# Patient Record
Sex: MALE | Race: WHITE | Employment: FULL TIME | URBAN - METROPOLITAN AREA
[De-identification: names, ages, dates, MRNs, and addresses within clinical notes are randomized per-mention and may not be internally consistent; named-entity substitution may affect disease eponyms.]

---

## 2019-08-01 ENCOUNTER — HOSPITAL ENCOUNTER (EMERGENCY)
Facility: CLINIC | Age: 29
Discharge: HOME | End: 2019-08-01
Attending: STUDENT IN AN ORGANIZED HEALTH CARE EDUCATION/TRAINING PROGRAM | Admitting: STUDENT IN AN ORGANIZED HEALTH CARE EDUCATION/TRAINING PROGRAM

## 2019-08-01 VITALS
HEIGHT: 71 IN | DIASTOLIC BLOOD PRESSURE: 63 MMHG | WEIGHT: 220 LBS | HEART RATE: 99 BPM | OXYGEN SATURATION: 95 % | RESPIRATION RATE: 18 BRPM | SYSTOLIC BLOOD PRESSURE: 132 MMHG | BODY MASS INDEX: 30.8 KG/M2 | TEMPERATURE: 98.6 F

## 2019-08-01 DIAGNOSIS — S81.811A LACERATION OF RIGHT LOWER EXTREMITY, INITIAL ENCOUNTER: ICD-10-CM

## 2019-08-01 PROCEDURE — 12002 RPR S/N/AX/GEN/TRNK2.6-7.5CM: CPT

## 2019-08-01 PROCEDURE — 12002 RPR S/N/AX/GEN/TRNK2.6-7.5CM: CPT | Mod: Z6 | Performed by: STUDENT IN AN ORGANIZED HEALTH CARE EDUCATION/TRAINING PROGRAM

## 2019-08-01 PROCEDURE — 99283 EMERGENCY DEPT VISIT LOW MDM: CPT | Mod: 25 | Performed by: STUDENT IN AN ORGANIZED HEALTH CARE EDUCATION/TRAINING PROGRAM

## 2019-08-01 PROCEDURE — 99284 EMERGENCY DEPT VISIT MOD MDM: CPT | Mod: 25

## 2019-08-01 ASSESSMENT — MIFFLIN-ST. JEOR: SCORE: 1985.04

## 2019-08-01 NOTE — ED PROVIDER NOTES
"  History     Chief Complaint   Patient presents with     Laceration     R leg/hip     HPI  Phillip Sheriff is a 29 year old male who presents for evaluation of right lower externally laceration sustained 1 hour prior to arrival.  Patient explains that he had used a new knife to attempt to cut a zip tie he was using as a belt when the knife slipped resulting in a cut through his jeans along the proximal right leg.  Lacerations on the lateral aspect of his right leg, bleeding controlled prior to arrival with pressure and bandages.  He believes his tetanus is up-to-date, checked and was last received in 2015.  He denies weakness or sensory deficits.  Ambulatory without bony pain.    Allergies:  No Known Allergies    Problem List:    There are no active problems to display for this patient.       Past Medical History:    No past medical history on file.    Past Surgical History:    No past surgical history on file.    Family History:    No family history on file.    Social History:  Marital Status:   [2]  Social History     Tobacco Use     Smoking status: Not on file   Substance Use Topics     Alcohol use: Not on file     Drug use: Not on file        Medications:      No current outpatient medications on file.      Review of Systems  Constitutional:  Negative for fever or illness.  Neurological:  Negative for weakness or sensory deficits.  Skin: Positive for laceration of right leg.    All others reviewed and are negative.      Physical Exam   BP: (!) 151/82  Pulse: 99  Temp: 98.6  F (37  C)  Resp: 18  Height: 180.3 cm (5' 11\")  Weight: 99.8 kg (220 lb)  SpO2: 96 %      Physical Exam  Constitutional:  Well developed, well nourished.  Appears nontoxic and in no acute distress.   HENT:  Normocephalic and atraumatic.  Eyes:  Conjunctivae are normal.  Neck:  Neck supple.  Cardiovascular:  No cyanosis.   Respiratory:  Effort normal, no respiratory distress.   Musculoskeletal:  Moves extremities spontaneously.  No " right hip or femur tenderness.  Neurological:  Patient is alert.  Skin:  Skin is warm and dry.  5 cm laceration across proximal lateral right leg.  Psychiatric:  Normal mood and affect.      ED Course        Procedures          [facility] Procedure Note       Procedure:  Laceration Repair   Performed by:  Tomer Knight    Consent:  Patient who states an understanding of the procedure being performed after discussing the risks, benefits, and alternatives of the agreed method of wound repair.  They understand that although the wound has been cleaned/irrigated thoroughly, we cannot with absolute certainty prevent infection and they must monitor the healing process closely for signs of infection.  Also, all skin wounds will form a scar but the repair will improve the cosmetic outcomes.    Body area:  Right leg  Laceration length:  5 cm  Contamination:  The wound is not contaminated.  Foreign bodies after meticulous investigation:  none  Tendon involvement:  none  Nerve involvement:  Sensation completely intact.  Anesthesia type:  Local  Local anesthetic agent:  Bupivacaine 0.5%  Anesthetic total:  8 ml  Irrigation:  Copious irrigation via normal saline.  Preparation:  Patient was cleaned and draped in usual sterile fashion for repair.    Debridement:  none  Skin closure:  Closed with 9 x 3.0 Ethilon  Technique:  interrupted  Approximation:  Close  Approximation difficulty:  simple    Patient tolerance: Patient tolerated the procedure well with no immediate complications.    Nursing staff was assigned to apply antibiotic ointment and dress the wound after the repair.     They have been instructed to monitor wound healing closely for any signs of infection.          Critical Care time:  none               No results found for this or any previous visit (from the past 24 hour(s)).    Medications - No data to display    Assessments & Plan (with Medical Decision Making)   Phillip Sheriff is a 29 year  old male who presents to the department for evaluation of   Laceration of right leg. Based on his symptoms and the clinical examination, there is no evidence to suggest bony injury, joint involvement, tendon laceration, or neurovascular deficits. His wound was cleaned, irrigated, thoroughly inspected and no foreign body or heavy contamination found.  The laceration repaired, wound edges well approximated, and the patient tolerated well without complications.  After repair antibiotic ointment was applied and the wound was dressed.  They were instructed to remove the bandage within 24 hours and apply OTC antibiotic ointment twice daily while healing.  Recommended follow-up in 10 days at a primary care clinic for reevaluation of the wound and removal of sutures.     During the repair we discussed the likelihood that there will be some residual scarring.  Although the wound was thoroughly cleaned/irrigated, he has been instructed to monitor the wound closely for healing or signs of infection such as increasing pain, redness, discharge, or fever.  If any are present they should return to the emergency department.      Disclaimer: This note consists of symbols derived from keyboarding, dictation, and/or voice recognition software. As a result, there may be errors in the script that have gone undetected.  Please consider this when interpreting information found in the chart.       I have reviewed the nursing notes.    I have reviewed the findings, diagnosis, plan and need for follow up with the patient.          Medication List      There are no discharge medications for this visit.         Final diagnoses:   Laceration of right lower extremity, initial encounter       8/1/2019   [facility] EMERGENCY DEPARTMENT     Tomer Knight DO  08/01/19 0613

## 2019-08-01 NOTE — ED AVS SNAPSHOT
[facility]  [address] [address]  Phone:  [phone]  Fax:  [phone]                                    Phillip Sheriff   MRN: [id number]    Department:  [facility]   Date of Visit:  8/1/2019           After Visit Summary Signature Page    I have received my discharge instructions, and my questions have been answered. I have discussed any challenges I see with this plan with the nurse or doctor.    ..........................................................................................................................................  Patient/Patient Representative Signature      ..........................................................................................................................................  Patient Representative Print Name and Relationship to Patient    ..................................................               ................................................  Date                                   Time    ..........................................................................................................................................  Reviewed by Signature/Title    ...................................................              ..............................................  Date                                               Time          22EPIC Rev 08/18

## 2020-12-04 ENCOUNTER — VIRTUAL VISIT (OUTPATIENT)
Dept: FAMILY MEDICINE | Facility: OTHER | Age: 30
End: 2020-12-04

## 2020-12-04 NOTE — PROGRESS NOTES
"Date: 2020 16:31:00  Clinician: Jacque Costello  Clinician NPI: [id number]  Patient: Phillip Sheriff  Patient : 1990  Patient Address: [address] [place] [address]  Patient Phone: [phone]  Visit Protocol: URI  Patient Summary:  Phillip is a 30 year old ( : 1990 ) male who initiated a [facility] Visit for COVID-19 (Coronavirus) evaluation and screening. When asked the question \"Please sign me up to receive news, health information and promotions. \", Phillip responded \"No\".    Phillip states his symptoms started 1-2 days ago.   His symptoms consist of a headache and facial pain or pressure.   Symptom details     Facial pain or pressure: The facial pain or pressure feels worse when bending over or leaning forward.     Headache: He states the headache is mild (1-3 on a 10 point pain scale).      Phillip denies having ear pain, wheezing, fever, cough, nasal congestion, nausea, vomiting, rhinitis, myalgias, chills, malaise, sore throat, teeth pain, ageusia, diarrhea, and anosmia. He also denies taking antibiotic medication in the past month and having recent facial or sinus surgery in the past 60 days. He is not experiencing dyspnea.    Pertinent COVID-19 (Coronavirus) information  Phillip does not work or volunteer as healthcare worker or a . In the past 14 days, Phillip has not worked or volunteered at a healthcare facility or group living setting.   In the past 14 days, he also has not lived in a congregate living setting.   Phillip has had a close contact with a laboratory-confirmed COVID-19 patient within 14 days of symptom onset. He was not exposed at his work. Date Phillip was exposed to the laboratory-confirmed COVID-19 patient: 2020   Additional information about contact with COVID-19 (Coronavirus) patient as reported by the patient (free text): In law    Since 2019, Phillip has not been tested for COVID-19 and has not had upper respiratory infection or " influenza-like illness.   Pertinent medical history  He has not been told by his provider to avoid NSAIDs.   Phillip does not have diabetes. He denies having immunosuppressive conditions (e.g., chemotherapy, HIV, organ transplant, long-term use of steroids or other immunosuppressive medications, splenectomy). He does not have severe COPD and congestive heart failure. He does not have asthma.   Phillip needs a return to work/school note.   Weight: 230 lbs   Phillip does not smoke or use smokeless tobacco.   Weight: 230 lbs    MEDICATIONS: No current medications, ALLERGIES: NKDA  Clinician Response:  Dear Phillip,   Your symptoms show that you may have coronavirus (COVID-19). This illness can cause fever, cough and trouble breathing. Many people get a mild case and get better on their own. Some people can get very sick.  What should I do?  We would like to test you for this virus.   1. Please call [phone] to schedule your visit. Explain that you were referred by [facility] to have a COVID-19 test. Be ready to share your [facility] visit ID number.  * If you need to schedule in [facility] please call [phone] or for [facility] employees please call [phone].  * If you need to schedule in the [place] area please call [phone]. [employer] [facility] employees call [phone].  The following will serve as your written order for this COVID Test, ordered by me, for the indication of suspected COVID [Z20.828]: The test will be ordered in [employer], our electronic health record, after you are scheduled. It will show as ordered and authorized by Todd Chaney MD.  Order: COVID-19 (Coronavirus) PCR for SYMPTOMATIC testing from [facility].   2. When it's time for your COVID test:  Stay at least 6 feet away from others. (If someone will drive you to your test, stay in the backseat, as far away from the  as you can.)   Cover your mouth and nose with a mask, tissue or washcloth.  Go straight to the testing site. Don't make any stops on the way  "there or back.      3.Starting now: Stay home and away from others (self-isolate) until:   You've had no fever---and no medicine that reduces fever---for one full day (24 hours). And...   Your other symptoms have gotten better. For example, your cough or breathing has improved. And...   At least 10 days have passed since your symptoms started.       During this time, don't leave the house except for testing or medical care.   Stay in your own room, even for meals. Use your own bathroom if you can.   Stay away from others in your home. No hugging, kissing or shaking hands. No visitors.  Don't go to work, school or anywhere else.    Clean \"high touch\" surfaces often (doorknobs, counters, handles, etc.). Use a household cleaning spray or wipes. You'll find a full list of  on the EPA website: www.epa.gov/pesticide-registration/list-n-disinfectants-use-against-sars-cov-2.   Cover your mouth and nose with a mask, tissue or washcloth to avoid spreading germs.  Wash your hands and face often. Use soap and water.  Caregivers in these groups are at risk for severe illness due to COVID-19:  o People 65 years and older  o People who live in a nursing home or long-term care facility  o People with chronic disease (lung, heart, cancer, diabetes, kidney, liver, immunologic)  o People who have a weakened immune system, including those who:   Are in cancer treatment  Take medicine that weakens the immune system, such as corticosteroids  Had a bone marrow or organ transplant  Have an immune deficiency  Have poorly controlled HIV or AIDS  Are obese (body mass index of 40 or higher)  Smoke regularly   o Caregivers should wear gloves while washing dishes, handling laundry and cleaning bedrooms and bathrooms.  o Use caution when washing and drying laundry: Don't shake dirty laundry, and use the warmest water setting that you can.  o For more tips, go to www.cdc.gov/coronavirus/2019-ncov/downloads/10Things.pdf.    4.Sign up for " [employer]. We know it's scary to hear that you might have COVID-19. We want to track your symptoms to make sure you're okay over the next 2 weeks. Please look for an email from [employer]---this is a free, online program that we'll use to keep in touch. To sign up, follow the link in the email. Learn more at [email or web address]  How can I take care of myself?   Get lots of rest. Drink extra fluids (unless a doctor has told you not to).   Take Tylenol (acetaminophen) for fever or pain. If you have liver or kidney problems, ask your family doctor if it's okay to take Tylenol.   Adults can take either:    650 mg (two 325 mg pills) every 4 to 6 hours, or...   1,000 mg (two 500 mg pills) every 8 hours as needed.    Note: Don't take more than 3,000 mg in one day. Acetaminophen is found in many medicines (both prescribed and over-the-counter medicines). Read all labels to be sure you don't take too much.   For children, check the Tylenol bottle for the right dose. The dose is based on the child's age or weight.    If you have other health problems (like cancer, heart failure, an organ transplant or severe kidney disease): Call your specialty clinic if you don't feel better in the next 2 days.       Know when to call 911. Emergency warning signs include:    Trouble breathing or shortness of breath Pain or pressure in the chest that doesn't go away Feeling confused like you haven't felt before, or not being able to wake up Bluish-colored lips or face.  Where can I get more information?    [employer] [facility] -- About COVID-19: [email or web address]   CDC -- What to Do If You're Sick: www.cdc.gov/coronavirus/2019-ncov/about/steps-when-sick.html   CDC -- Ending Home Isolation: www.cdc.gov/coronavirus/2019-ncov/hcp/disposition-in-home-patients.html   CDC -- Caring for Someone: www.cdc.gov/coronavirus/2019-ncov/if-you-are-sick/care-for-someone.html   [facility] -- Interim Guidance for Hospital Discharge to  Home: www.health.[facility].[place]./diseases/coronavirus/hcp/hospdischarge.pdf   [facility] clinical trials (COVID-19 research studies): clinicalaffairs.[facility]/umn-clinical-trials    Below are the COVID-19 hotlines at the [place] Department of Health ([facility]). Interpreters are available.    For health questions: Call 323-380-3599 or 1-894.127.8205 (7 a.m. to 7 p.m.) For questions about schools and childcare: Call 192-145-1737 or 1-229.323.1850 (7 a.m. to 7 p.m.)    Diagnosis: Contact with and (suspected) exposure to other viral communicable diseases  Diagnosis ICD: Z20.828

## 2021-03-04 ENCOUNTER — MEDICAL CORRESPONDENCE (OUTPATIENT)
Dept: HEALTH INFORMATION MANAGEMENT | Facility: CLINIC | Age: 31
End: 2021-03-04

## 2021-03-08 DIAGNOSIS — M54.9 BACK PAIN: Primary | ICD-10-CM

## 2021-07-14 ENCOUNTER — OFFICE VISIT (OUTPATIENT)
Dept: URGENT CARE | Facility: URGENT CARE | Age: 31
End: 2021-07-14
Payer: COMMERCIAL

## 2021-07-14 VITALS
BODY MASS INDEX: 34.59 KG/M2 | HEART RATE: 95 BPM | DIASTOLIC BLOOD PRESSURE: 80 MMHG | SYSTOLIC BLOOD PRESSURE: 114 MMHG | WEIGHT: 248 LBS | TEMPERATURE: 98.6 F | OXYGEN SATURATION: 96 %

## 2021-07-14 DIAGNOSIS — L05.91 PILONIDAL CYST: Primary | ICD-10-CM

## 2021-07-14 PROCEDURE — 99203 OFFICE O/P NEW LOW 30 MIN: CPT | Performed by: NURSE PRACTITIONER

## 2021-07-14 RX ORDER — CYCLOBENZAPRINE HCL 10 MG
TABLET ORAL
COMMUNITY
Start: 2021-07-12

## 2021-07-14 ASSESSMENT — PAIN SCALES - GENERAL: PAINLEVEL: WORST PAIN (10)

## 2021-07-15 ENCOUNTER — HOSPITAL ENCOUNTER (EMERGENCY)
Facility: CLINIC | Age: 31
Discharge: HOME | End: 2021-07-15
Attending: FAMILY MEDICINE | Admitting: FAMILY MEDICINE
Payer: COMMERCIAL

## 2021-07-15 ENCOUNTER — OFFICE VISIT (OUTPATIENT)
Dept: SURGERY | Facility: CLINIC | Age: 31
End: 2021-07-15
Attending: NURSE PRACTITIONER
Payer: COMMERCIAL

## 2021-07-15 VITALS
WEIGHT: 248 LBS | RESPIRATION RATE: 16 BRPM | DIASTOLIC BLOOD PRESSURE: 76 MMHG | HEIGHT: 71 IN | SYSTOLIC BLOOD PRESSURE: 150 MMHG | HEART RATE: 77 BPM | TEMPERATURE: 98.4 F | OXYGEN SATURATION: 99 % | BODY MASS INDEX: 34.72 KG/M2

## 2021-07-15 VITALS — RESPIRATION RATE: 16 BRPM | HEART RATE: 78 BPM | DIASTOLIC BLOOD PRESSURE: 88 MMHG | SYSTOLIC BLOOD PRESSURE: 146 MMHG

## 2021-07-15 DIAGNOSIS — L05.91 PILONIDAL CYST: ICD-10-CM

## 2021-07-15 DIAGNOSIS — L05.01 PILONIDAL CYST WITH ABSCESS: ICD-10-CM

## 2021-07-15 PROCEDURE — 99284 EMERGENCY DEPT VISIT MOD MDM: CPT | Performed by: FAMILY MEDICINE

## 2021-07-15 PROCEDURE — 99282 EMERGENCY DEPT VISIT SF MDM: CPT | Performed by: FAMILY MEDICINE

## 2021-07-15 PROCEDURE — 99203 OFFICE O/P NEW LOW 30 MIN: CPT | Performed by: SURGERY

## 2021-07-15 ASSESSMENT — MIFFLIN-ST. JEOR: SCORE: 2102.05

## 2021-07-15 NOTE — DISCHARGE INSTRUCTIONS
ICD-10-CM    1. Pilonidal cyst with abscess  L05.01     This has already drained so I&D not done here.  Take augmentin orally twice daily for 10 days.  return for recurrence, fever.  see surgery for follow-up.  Let your spine procedure provider know about the pilonidal cyst recurrence - this appears to be unrelated but please have them eval prior injection site.

## 2021-07-15 NOTE — ED TRIAGE NOTES
Pt here for evaluation of cyst on tailbone. States been there 1.5 weeks. At urgent care last night for eval, plan was for general surgery to have it lanced. No antibiotics prescribed due to recent cortisone injection. This AM states copious amounts of white drainage. No fevers or other concerns.

## 2021-07-15 NOTE — NURSING NOTE
"Chief Complaint   Patient presents with     Consult     Pilonidal Cyst        Initial BP (!) 146/88 (BP Location: Right arm, Patient Position: Chair, Cuff Size: Adult Regular)   Pulse 78   Resp 16  Estimated body mass index is 34.59 kg/m  as calculated from the following:    Height as of an earlier encounter on 7/15/21: 1.803 m (5' 11\").    Weight as of an earlier encounter on 7/15/21: 112.5 kg (248 lb).  BP completed using cuff size: regular   Medications and allergies reviewed.      Renee TREVINO CMA     "

## 2021-07-15 NOTE — PROGRESS NOTES
Assessment & Plan   Problem List Items Addressed This Visit     None      Visit Diagnoses     Pilonidal cyst    -  Primary    Relevant Orders    Adult General Surg Referral             15 minutes spent on the date of the encounter doing chart review, history and exam, documentation and further activities per the note       Patient Instructions   Call and set up appointment for tomorrow or Friday.    Follow-up with your primary care provider next week and as needed.    Indications for emergent return to emergency department discussed with patient, who verbalized good understanding and agreement.  Patient understands the limitations of today's evaluation.         Patient Education     Infected Pilonidal Cyst (Incision & Drainage)   A pilonidal cyst is a swelling that starts under the skin on the sacrum near the tailbone. It may look like a small dimple. It can fill with skin oils, hair, and dead skin cells. It may stay small or grow larger. Because it often has an opening to the surface, it may become infected with normal skin bacteria.   Cause  The cause of pilonidal cysts has been debated since they were first recognized. It may be present at birth and go unnoticed. Injury, rubbing, or skin irritation may also cause pilonidal cysts. It can also be caused by an ingrown hair. Most likely, the cause is a combination of these things. Because some injury or irritation can lead to pilonidal cysts, it can be more common in people who sit or drive a lot for work.   Symptoms  A pilonidal cyst may be small and painless. If it's inflamed or infected, you may have these symptoms:     Swelling    Irritation or redness    Pain    Drainage  The cyst can swell and drain on its own. The swelling and drainage can come and go.  Treatment  Your pilonidal cyst was drained with a small incision using local anesthesia.  After the incision and drainage, gauze packing may be inserted into the opening. If so, it should be removed in 1 to  2 days. Antibiotics are not required in the treatment of a simple abscess, unless the infection is spreading into the skin around the wound. The wound will take about 1 to 2 weeks to heal depending on the size of the cyst.   Home care  Wound care    Pus may drain from the wound for the first few days. Cover the wound with a clean dry bandage. Change the bandage if it becomes soaked with blood or pus, or if it gets soiled with feces or urine.    If gauze packing was placed inside the cyst cavity, you may be told to remove it yourself. You may do this in the shower. Once the packing is removed, you should wash the area carefully in the shower once a day. Do this until the skin opening has closed. It's OK to direct the shower spray directly into the opening if this is not too painful.  Medicines    Take acetaminophen or ibuprofen for pain, unless you were given a different pain medicine to use. Talk with your healthcare provider before using these medicines if you have chronic liver or kidney disease or have ever had a stomach ulcer or digestive bleeding. Also talk with your provider if you are taking blood-thinner medicines.    If you were given antibiotics, take them until they are gone. It's important to finish the antibiotics even if the wound looks better. This is to make sure the infection has cleared completely.    Use antibiotic cream or ointment if your healthcare provider tells you to do so.    Prevention  Once this infection has healed, the following may decrease the risk of future infections:    Keep the area of the cyst clean by bathing or showering daily.    Don't wear tight-fitting clothing to minimize perspiration and irritation of the skin.    Pilonidal cysts that come back may be completely removed by surgery. But this can only be done at a time when there is no infection. Ask your healthcare provider for more information.  Follow-up care  Follow up with your healthcare provider, or as advised. If a  gauze packing was inserted in your wound, it should be removed in 1 to 2 days, or as directed. Check your wound every day for the signs of infection listed below.   When to seek medical advice  Call your healthcare provider right away if any of these occur:    Pus continues to come from the cyst for 5 days after the incision    Increasing redness, local pain, or swelling    Fever of 100.4 F (38.0 C) or higher for more than 2 days, or as advised by your healthcare provider  [employer] last reviewed this educational content on 8/1/2019 2000-2021 [employer]. All rights reserved. This information is not intended as a substitute for professional medical care. Always follow your healthcare professional's instructions.           Patient Education     Pilonidal Cyst  A pilonidal cyst is found near the base of the spine (tailbone) or top of the buttocks crease. It may look like a pit or small depression. In some cases, it may have a hollow tunnel (sinus tract) that connects it to the surface of the skin. Normally, a pilonidal cyst does not cause symptoms. But if it becomes infected, it can cause pain and swelling.     What causes a pilonidal cyst and who gets them?   Two main causes are:    Ingrown hairs. This happens when a hair is forced under the skin or when a hair follicle ruptures.    Injury to the area. This can happen from sitting for long periods of time.  These cysts are often diagnosed in people between ages 16 and 26. But people of any age can have a pilonidal cyst. They affect both men and women, but they are more common in men.   Symptoms of a pilonidal cyst infection   A pilonidal cyst may not cause symptoms unless it becomes infected or inflamed. Once a pilonidal cyst becomes infected, it is called a pilonidal abscess. Infection or inflammation from irritation may cause the following symptoms:     Pain, redness, and swelling of the cyst and area around it    Foul-smelling drainage from the  cyst    Fever  Diagnosing a pilonidal cyst  A pilonidal cyst can be diagnosed by how it looks and by its location. Your healthcare provider will examine the suspected cyst to confirm a diagnosis. You will be told if any tests are needed.   Treating a pilonidal cyst infection   Most pilonidal cysts are left alone. But if a cyst becomes infected or inflamed, you need treatment. It may include the following:     Incision and drainage. If needed, the cyst is cut open, and pus and other infected material is allowed to drain.    Antibiotic medicines for the infection.  Know that medicines don't make the cyst go away, and antibiotics have limited use in treating an abscess. They also won t keep a cyst from becoming infected again.    Hot water soaks. These can help draw out the infection and ease pain and itching.    Surgery to remove the cyst (excision).  This may be done if the infection is severe, does not respond to medicine, or keeps coming back. A surgeon cuts and removes the cyst and the tissue around it. Your healthcare provider can tell you more if this is needed.    Laser hair removalaround the area. This may decrease the frequency of flare-ups.  Preventing infection  A pilonidal cyst can easily become infected. The following to help prevent infections:     Keep the cyst and surrounding skin area clean.    Remove hair from the area of the cyst regularly. Ask your healthcare provider about safe hair removal products or procedures.    Don't sit in one position for long periods of time. This helps to reduce weight and pressure on your tailbone area. Sitting on a special cushion to relieve pressure on the tailbone may also help. Ask your healthcare provider about where to purchase these cushions.    Don't wear tight-fitting clothing to reduce skin irritation around the cyst.  [employer] last reviewed this educational content on 8/1/2019 2000-2021 [employer]. All rights reserved. This information is  not intended as a substitute for professional medical care. Always follow your healthcare professional's instructions.               Return in about 1 day (around 7/15/2021) for Follow up with your specialist.    MELQUIADES Hernandez CNP  [facility]    Xiomara Fisher is a 31 year old who presents for the following health issues     HPI     Chief Complaint   Patient presents with     Derm Problem     Cyst on tailbone- found on Saturday.      He thinks he may have had a pilonidal cyst when he was younger. He also had a spinal injection 3 weeks ago.      Review of Systems   Constitutional, HEENT, cardiovascular, pulmonary, GI, , musculoskeletal, neuro, skin, endocrine and psych systems are negative, except as otherwise noted.      Objective    /80 (BP Location: Left arm, Patient Position: Sitting)   Pulse 95   Temp 98.6  F (37  C) (Tympanic)   Wt 112.5 kg (248 lb)   SpO2 96%   BMI 34.59 kg/m    Body mass index is 34.59 kg/m .  Physical Exam   GENERAL: healthy, alert and no distress, nontoxic in appearance  EYES: Eyes grossly normal to inspection, PERRL and conjunctivae and sclerae normal  HENT: normocephalic  NECK: supple with full ROM  MS: Top of gluteal fold has red, swollen mass about 3 cm diameter. Tender to touch.    No results found for this or any previous visit (from the past 24 hour(s)).

## 2021-07-15 NOTE — PATIENT INSTRUCTIONS
Call and set up appointment for tomorrow or Friday.    Follow-up with your primary care provider next week and as needed.    Indications for emergent return to emergency department discussed with patient, who verbalized good understanding and agreement.  Patient understands the limitations of today's evaluation.         Patient Education     Infected Pilonidal Cyst (Incision & Drainage)   A pilonidal cyst is a swelling that starts under the skin on the sacrum near the tailbone. It may look like a small dimple. It can fill with skin oils, hair, and dead skin cells. It may stay small or grow larger. Because it often has an opening to the surface, it may become infected with normal skin bacteria.   Cause  The cause of pilonidal cysts has been debated since they were first recognized. It may be present at birth and go unnoticed. Injury, rubbing, or skin irritation may also cause pilonidal cysts. It can also be caused by an ingrown hair. Most likely, the cause is a combination of these things. Because some injury or irritation can lead to pilonidal cysts, it can be more common in people who sit or drive a lot for work.   Symptoms  A pilonidal cyst may be small and painless. If it's inflamed or infected, you may have these symptoms:     Swelling    Irritation or redness    Pain    Drainage  The cyst can swell and drain on its own. The swelling and drainage can come and go.  Treatment  Your pilonidal cyst was drained with a small incision using local anesthesia.  After the incision and drainage, gauze packing may be inserted into the opening. If so, it should be removed in 1 to 2 days. Antibiotics are not required in the treatment of a simple abscess, unless the infection is spreading into the skin around the wound. The wound will take about 1 to 2 weeks to heal depending on the size of the cyst.   Home care  Wound care    Pus may drain from the wound for the first few days. Cover the wound with a clean dry bandage. Change  the bandage if it becomes soaked with blood or pus, or if it gets soiled with feces or urine.    If gauze packing was placed inside the cyst cavity, you may be told to remove it yourself. You may do this in the shower. Once the packing is removed, you should wash the area carefully in the shower once a day. Do this until the skin opening has closed. It's OK to direct the shower spray directly into the opening if this is not too painful.  Medicines    Take acetaminophen or ibuprofen for pain, unless you were given a different pain medicine to use. Talk with your healthcare provider before using these medicines if you have chronic liver or kidney disease or have ever had a stomach ulcer or digestive bleeding. Also talk with your provider if you are taking blood-thinner medicines.    If you were given antibiotics, take them until they are gone. It's important to finish the antibiotics even if the wound looks better. This is to make sure the infection has cleared completely.    Use antibiotic cream or ointment if your healthcare provider tells you to do so.    Prevention  Once this infection has healed, the following may decrease the risk of future infections:    Keep the area of the cyst clean by bathing or showering daily.    Don't wear tight-fitting clothing to minimize perspiration and irritation of the skin.    Pilonidal cysts that come back may be completely removed by surgery. But this can only be done at a time when there is no infection. Ask your healthcare provider for more information.  Follow-up care  Follow up with your healthcare provider, or as advised. If a gauze packing was inserted in your wound, it should be removed in 1 to 2 days, or as directed. Check your wound every day for the signs of infection listed below.   When to seek medical advice  Call your healthcare provider right away if any of these occur:    Pus continues to come from the cyst for 5 days after the incision    Increasing redness,  local pain, or swelling    Fever of 100.4 F (38.0 C) or higher for more than 2 days, or as advised by your healthcare provider  [employer] last reviewed this educational content on 8/1/2019 2000-2021 [employer]. All rights reserved. This information is not intended as a substitute for professional medical care. Always follow your healthcare professional's instructions.           Patient Education     Pilonidal Cyst  A pilonidal cyst is found near the base of the spine (tailbone) or top of the buttocks crease. It may look like a pit or small depression. In some cases, it may have a hollow tunnel (sinus tract) that connects it to the surface of the skin. Normally, a pilonidal cyst does not cause symptoms. But if it becomes infected, it can cause pain and swelling.     What causes a pilonidal cyst and who gets them?   Two main causes are:    Ingrown hairs. This happens when a hair is forced under the skin or when a hair follicle ruptures.    Injury to the area. This can happen from sitting for long periods of time.  These cysts are often diagnosed in people between ages 16 and 26. But people of any age can have a pilonidal cyst. They affect both men and women, but they are more common in men.   Symptoms of a pilonidal cyst infection   A pilonidal cyst may not cause symptoms unless it becomes infected or inflamed. Once a pilonidal cyst becomes infected, it is called a pilonidal abscess. Infection or inflammation from irritation may cause the following symptoms:     Pain, redness, and swelling of the cyst and area around it    Foul-smelling drainage from the cyst    Fever  Diagnosing a pilonidal cyst  A pilonidal cyst can be diagnosed by how it looks and by its location. Your healthcare provider will examine the suspected cyst to confirm a diagnosis. You will be told if any tests are needed.   Treating a pilonidal cyst infection   Most pilonidal cysts are left alone. But if a cyst becomes infected or  inflamed, you need treatment. It may include the following:     Incision and drainage. If needed, the cyst is cut open, and pus and other infected material is allowed to drain.    Antibiotic medicines for the infection.  Know that medicines don't make the cyst go away, and antibiotics have limited use in treating an abscess. They also won t keep a cyst from becoming infected again.    Hot water soaks. These can help draw out the infection and ease pain and itching.    Surgery to remove the cyst (excision).  This may be done if the infection is severe, does not respond to medicine, or keeps coming back. A surgeon cuts and removes the cyst and the tissue around it. Your healthcare provider can tell you more if this is needed.    Laser hair removalaround the area. This may decrease the frequency of flare-ups.  Preventing infection  A pilonidal cyst can easily become infected. The following to help prevent infections:     Keep the cyst and surrounding skin area clean.    Remove hair from the area of the cyst regularly. Ask your healthcare provider about safe hair removal products or procedures.    Don't sit in one position for long periods of time. This helps to reduce weight and pressure on your tailbone area. Sitting on a special cushion to relieve pressure on the tailbone may also help. Ask your healthcare provider about where to purchase these cushions.    Don't wear tight-fitting clothing to reduce skin irritation around the cyst.  [employer] last reviewed this educational content on 8/1/2019 2000-2021 [employer]. All rights reserved. This information is not intended as a substitute for professional medical care. Always follow your healthcare professional's instructions.

## 2021-07-15 NOTE — PROGRESS NOTES
Surgical Consultation/History and Physical  [facility]    Phillip is seen in consultation for pilonidal cyst, at the request of Physician No Ref-Primary.    Chief Complaint:  Pilonidal cyst    History of Present Illness: Phillip Sheriff is a 31 year old male presents with Pilonidal cyst.  Patient has had tailbone pain for past 1.5 weeks which gradually worsened.  Patient noted some increased swelling.  This morning he began having drainage from the area, he presented to ED, had antibiotics prescribed.  This has happened one time prior which spontaneously resolved.  Denies fevers or chills.      PMH: Negative    PSH: Negative      History reviewed. No pertinent family history.    Social History     Tobacco Use     Smoking status: Never Smoker     Smokeless tobacco: Never Used   Substance Use Topics     Alcohol use: Not Currently        History   Drug Use Not on file       Current Outpatient Medications   Medication Sig Dispense Refill     amoxicillin-clavulanate (AUGMENTIN) 875-125 MG tablet Take 1 tablet by mouth 2 times daily for 10 days 20 tablet 0     cyclobenzaprine (FLEXERIL) 10 MG tablet TAKE 1 2 TO 1 (ONE HALF TO ONE) TABLET BY MOUTH THREE TIMES DAILY AS NEEDED         No Known Allergies    Review of Systems:   10 point ROS otherwise negative    Physical Exam:  BP (!) 146/88 (BP Location: Right arm, Patient Position: Chair, Cuff Size: Adult Regular)   Pulse 78   Resp 16     Constitutional- No acute distress, well nourished, non-toxic  Eyes: Anicteric, no injection.  PERRL  ENT:  Normocephalic, atraumatic  Neck - supple, no LAD  Respiratory- Good inspiratory effort  Cardiovascular - No peripheral edema.  No clubbing.  Abdomen - Soft, non-tender, +BS, no hepatosplenomegaly, no palpable masses  Neuro - No focal neuro deficits, Alert and oriented x 3  Psych: Appropriate mood and affect  Musculoskeletal: Normal gait, symmetric strength.  FROM upper and lower extremities.  Skin: Warm, Dry;  Pilonidal cyst at right gluteal cleft, 1-2 cm superior to apex.  2.0 cm area of surrounding erythema.  No fluctuance, mild induration in the area.  Scant bloody purulent drainage    Assessment:  1. Pilonidal cyst      Plan:   Mr. Sheriff has an infected pilonidal cyst.  This has spontaneously drained and I do not feel additional drainage, packing or irrigation would benefit him much given the size, appearance and subacute presentation.  I recommended completing antibiotic course and following up in clinic for consideration of formal excision of the cystic area.  I advised him to do warm soaks or apply a warm pack intermittently (not directly on skin).  I will follow-up with him in 3 weeks to assess the cyst and determine if formal excision would be beneficial.  Patient comfortable with plan.        Zack Rehman,  on 7/15/2021 at 1:22 PM

## 2021-07-15 NOTE — PATIENT INSTRUCTIONS
Per physician instructions.    If you have questions or concerns on any instructions given to you by your provider today or if you need to schedule an appointment, you can reach us at [phone].  Listen to the menu for the Specialty Clinic option.      Thank you!

## 2021-07-15 NOTE — PROGRESS NOTES
Wound dressed w/gauze before discharge. Pt had questions about surg referral, states the original number didn't work. Encouraged to try again today and contact [facility] if the issue persists.   Vicky Schultz RN on 7/15/2021 at 7:33 AM

## 2021-07-15 NOTE — ED PROVIDER NOTES
"  History     Chief Complaint   Patient presents with     Cyst     HPI  Phillip Sheriff is a 31 year old male who presents with a pilonidal cyst recurrent after having a similar event about 10 years ago.  Seen in urgent care for the current lesion last evening and was referred for surgery for drainage.  Overnight he is drained this area after it ruptured on its own and has been actively draining since that time with decreased pain.  There is no fever.  He has no abdominal pain.  No significant change in stools or urine.  He is not currently on antibiotics.      He underwent an L5-S1 epidural steroid injection approximately 3 weeks ago.  There was no complication from that procedure no radicular symptoms.  No fever.  No weakness in the lower extremities.  No foot drop.  He only noticed the pilonidal cyst about 1-1/2 weeks ago.  These appear to be unrelated.     Allergies:  No Known Allergies    Problem List:    There are no problems to display for this patient.       Past Medical History:    History reviewed. No pertinent past medical history.    Past Surgical History:    History reviewed. No pertinent surgical history.    Family History:    History reviewed. No pertinent family history.    Social History:  Marital Status:   [2]  Social History     Tobacco Use     Smoking status: Never Smoker     Smokeless tobacco: Never Used   Substance Use Topics     Alcohol use: Not Currently     Drug use: None        Medications:    amoxicillin-clavulanate (AUGMENTIN) 875-125 MG tablet  cyclobenzaprine (FLEXERIL) 10 MG tablet          Review of Systems    ROS:  5 point ROS negative except as noted above in HPI, including Gen., Resp., CV, GI &  system review.    Physical Exam   BP: (!) 150/76  Pulse: 77  Temp: 98.4  F (36.9  C)  Resp: 16  Height: 180.3 cm (5' 11\")  Weight: 112.5 kg (248 lb)  SpO2: 99 %      Physical Exam     The superior aspect of the gluteal cleft has a 3 to 4 cm region of ecchymosis with a now " decompressed pilonidal cyst and a punctate that is draining mostly bloody material.  There is slight erythema to the area.  Findings are consistent with her now ruptured pilonidal cyst.  Minimal tenderness.  No fever.  Lower extremities with normal motor strength.  Normal distal sensation.    ED Course        Procedures              Critical Care time:  none               No results found for this or any previous visit (from the past 24 hour(s)).    Medications - No data to display    Assessments & Plan (with Medical Decision Making)     MDM: Phillip Sheriff is a 31 year old male who presents now after ruptured pilonidal cyst.  Suspect this was an abscess.  There is some possible mild cellulitis in the region I would recommend as there still is residual tissue here of treating with oral antibiotics.  Follow-up with general surgery for definitive management as this is likely recurred.    At this point I see no complication related to his lumbar epidural steroid injection but given the proximity to these 2 different events with weeks apart I recommend that he call his [place] proceduralist today and let them know that he developed a pilonidal cyst in a similar area and asked them to reevaluate as whether there is could have been a postsurgical complication.  He has no neurologic changes no systemic symptoms no symptoms suggestive of spinal epidural abscess or other complication but to be cautious and reevaluate by the surgeon who performed the procedure would be preferable and preferable before the weekend I emphasized this to the patient.    Have given precautions for return.  Recommend warm soaks to the area.  There is no indication for incision and drainage as it already drained today and there is still an open wound here.  Again it is not as large of a slit as I would typically make if I was incising and draining the area but now that is decompressed I did not recommend widening the incision.    I have  reviewed the nursing notes.    I have reviewed the findings, diagnosis, plan and need for follow up with the patient.       New Prescriptions    AMOXICILLIN-CLAVULANATE (AUGMENTIN) 875-125 MG TABLET    Take 1 tablet by mouth 2 times daily for 10 days       Final diagnoses:   Pilonidal cyst with abscess - This has already drained so I&D not done here.  Take augmentin orally twice daily for 10 days.  return for recurrence, fever.  see surgery for follow-up.  Let your spine procedure provider know about the pilonidal cyst recurrence - this appears to be unrelated but please have them eval prior injection site.       7/15/2021   [facility] EMERGENCY DEPT     Kevin Desai MD  07/15/21 1855

## 2021-07-15 NOTE — LETTER
7/15/2021         RE: Phillip Sheriff  [id number] [address] [facility] [id number]        Dear Colleague,    Thank you for referring your patient, Phillip Sheriff, to the [facility]. Please see a copy of my visit note below.    Surgical Consultation/History and Physical  [facility]    Phillip is seen in consultation for pilonidal cyst, at the request of Physician No Ref-Primary.    Chief Complaint:  Pilonidal cyst    History of Present Illness: Phillip Sheriff is a 31 year old male presents with Pilonidal cyst.  Patient has had tailbone pain for past 1.5 weeks which gradually worsened.  Patient noted some increased swelling.  This morning he began having drainage from the area, he presented to ED, had antibiotics prescribed.  This has happened one time prior which spontaneously resolved.  Denies fevers or chills.      PMH: Negative    PSH: Negative      History reviewed. No pertinent family history.    Social History     Tobacco Use     Smoking status: Never Smoker     Smokeless tobacco: Never Used   Substance Use Topics     Alcohol use: Not Currently        History   Drug Use Not on file       Current Outpatient Medications   Medication Sig Dispense Refill     amoxicillin-clavulanate (AUGMENTIN) 875-125 MG tablet Take 1 tablet by mouth 2 times daily for 10 days 20 tablet 0     cyclobenzaprine (FLEXERIL) 10 MG tablet TAKE 1 2 TO 1 (ONE HALF TO ONE) TABLET BY MOUTH THREE TIMES DAILY AS NEEDED         No Known Allergies    Review of Systems:   10 point ROS otherwise negative    Physical Exam:  BP (!) 146/88 (BP Location: Right arm, Patient Position: Chair, Cuff Size: Adult Regular)   Pulse 78   Resp 16     Constitutional- No acute distress, well nourished, non-toxic  Eyes: Anicteric, no injection.  PERRL  ENT:  Normocephalic, atraumatic  Neck - supple, no LAD  Respiratory- Good inspiratory effort  Cardiovascular - No peripheral edema.  No clubbing.  Abdomen - Soft,  non-tender, +BS, no hepatosplenomegaly, no palpable masses  Neuro - No focal neuro deficits, Alert and oriented x 3  Psych: Appropriate mood and affect  Musculoskeletal: Normal gait, symmetric strength.  FROM upper and lower extremities.  Skin: Warm, Dry; Pilonidal cyst at right gluteal cleft, 1-2 cm superior to apex.  2.0 cm area of surrounding erythema.  No fluctuance, mild induration in the area.  Scant bloody purulent drainage    Assessment:  1. Pilonidal cyst      Plan:   Mr. Sheriff has an infected pilonidal cyst.  This has spontaneously drained and I do not feel additional drainage, packing or irrigation would benefit him much given the size, appearance and subacute presentation.  I recommended completing antibiotic course and following up in clinic for consideration of formal excision of the cystic area.  I advised him to do warm soaks or apply a warm pack intermittently (not directly on skin).  I will follow-up with him in 3 weeks to assess the cyst and determine if formal excision would be beneficial.  Patient comfortable with plan.        Zack Rehman DO on 7/15/2021 at 1:22 PM        Again, thank you for allowing me to participate in the care of your patient.        Sincerely,        Zack Rehman DO